# Patient Record
Sex: MALE | Race: WHITE | NOT HISPANIC OR LATINO | Employment: FULL TIME | URBAN - METROPOLITAN AREA
[De-identification: names, ages, dates, MRNs, and addresses within clinical notes are randomized per-mention and may not be internally consistent; named-entity substitution may affect disease eponyms.]

---

## 2024-03-04 ENCOUNTER — TELEPHONE (OUTPATIENT)
Dept: TRANSPLANT | Facility: CLINIC | Age: 44
End: 2024-03-04
Payer: COMMERCIAL

## 2024-03-04 NOTE — TELEPHONE ENCOUNTER
[address]                  [address] [facility],  [facility]                                OPERATIVE REPORT    PATIENT NAME: Stefanie Stephenson                    :        1935  MED REC NO:   [id number]                            ROOM:       [facility]  ACCOUNT NO:   [de-identified]                           ADMIT DATE: 2020  PROVIDER:     Josr Garcia MD    DATE OF PROCEDURE:  2020    SURGEON:  Josr Garcia MD    PREOPERATIVE DIAGNOSES:  Compression fracture, T12, acute-on-chronic,  rule out metastasis; intractable pain. POSTOPERATIVE DIAGNOSES:  Compression fracture, T12, acute-on-chronic,  rule out metastasis; intractable pain. PROCEDURES:  Balloon osteoplasty, that is, kyphoplasty, T12; bone  biopsy, T12; and injection of epidural steroid. TECHNIQUE:  The patient was placed on the operating room table in supine  position and after satisfactory endotracheal general anesthesia had been  administered, the patient was turned into prone position on the  operating room table. The patient's lower back and mid back were  prepared with Betadine scrub and solution and routinely draped. Using  AP and lateral fluoroscopy, the level of T12 was marked on the back. A  point which was 3 cm from the midline on the left side was infiltrated  with 1% lidocaine solution. A 3-mm incision made over the spine. Through this, a trocar and cannula from the Kyphon kit was inserted  under fluoroscopic guidance through the pedicle of T12 into body of T12. As the tip of the needle was lying at the junction of pedicle and the  body, trocar was removed. Bone biopsy needle was inserted and advanced  into the body of T12 and a piece of bone was taken and sent to  Pathology. Then balloon was inserted into body of T12 and inflated to a  volume of 1 mL. The balloon did not cross the midline necessitating  insertion of second needle at this level.     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   [facility]   Department of Transportation  [phone] Fax     I told him we would work on this today. He did obtain his bloodwork this morning.     Mary   fashion, a point which was 3 cm from the midline on the  right side was infiltrated with 1% lidocaine solution. 3 mm incision  made over the spine. Through this, a trocar and a cannula followed by  the biopsy needle followed by the balloon. The balloons were removed  and bone cement was injected passively using injecting tubes. 6 mL of  the bone cement injected into the body of T12 from both sides under  fluoroscopic guidance. At the conclusion of injection, injecting tubes  were removed and the cannulas were removed and the patient was left in  this position for 10 minutes. During this time, a Tuohy needle from the  epidural anesthesia tray was inserted into the epidural space at level  of L2-L3. When the needle was lying in the epidural space, stylets were  removed. Epidural catheter was inserted and advanced into the epidural  space. Hub was connected to catheter and 80 mg of Depo-Medrol was  injected as a nerve block. Then the catheter and the needle were  removed. Band-Aid was applied over the area of insertion of needle, and  the patient sent to recovery room in satisfactory state. There was no  blood loss. Specimen was the bone biopsy of T12.         Chyna Vallecillo MD    D: 08/03/2020 13:42:14       T: 08/03/2020 14:17:32     [email or web address]  Job#: [id number]     Doc#: [id number]    CC:

## 2024-03-04 NOTE — TELEPHONE ENCOUNTER
Discussed with pt last week, aram  to be done before the letter would be done  Labs are drawn and pending, pt has a hx of rejection.

## 2024-03-04 NOTE — LETTER
March 4, 2024    Bernard Yanez  [address] [place] [address]          Dear Bernard Yanez,  MRN: [id number]    ***      If you have any questions or concerns, please don't hesitate to call.    Sincerely,        [facility]  [facility] [place] [address]  [phone]